# Patient Record
Sex: FEMALE | URBAN - METROPOLITAN AREA
[De-identification: names, ages, dates, MRNs, and addresses within clinical notes are randomized per-mention and may not be internally consistent; named-entity substitution may affect disease eponyms.]

---

## 2024-04-12 ENCOUNTER — OV NP (OUTPATIENT)
Dept: URBAN - METROPOLITAN AREA CLINIC 60 | Facility: CLINIC | Age: 54
End: 2024-04-12

## 2024-04-12 RX ORDER — CONJUGATED ESTROGENS 0.62 MG/G
APLICAR 0.5 GRAMOS USANDO EL APLICADOR 3 VECES POR SEMANA EN LAS NOCHES. (SUFICIENTE PARA 140 DIAS) CREAM VAGINAL
Qty: 30 GRAM | Refills: 0 | Status: ACTIVE | COMMUNITY

## 2024-04-12 RX ORDER — ATORVASTATIN CALCIUM 40 MG/1
TOMAR 1 TABLETA VIA ORAL TODAS LAS TARDES TABLET, FILM COATED ORAL
Qty: 30 EACH | Refills: 5 | Status: ACTIVE | COMMUNITY

## 2024-04-12 RX ORDER — BUDESONIDE 180 UG/1
HACER 2 ATOMIZACIONES VIA ORAL 2 VECES AL DIA AEROSOL, POWDER RESPIRATORY (INHALATION)
Qty: 1 EACH | Refills: 2 | Status: ACTIVE | COMMUNITY

## 2024-04-12 RX ORDER — EZETIMIBE 10 MG/1
TABLET ORAL
Qty: 90 TABLET | Status: ACTIVE | COMMUNITY

## 2024-04-12 RX ORDER — METRONIDAZOLE 7.5 MG/G
HACER 1 APLICACION EN LA VAGINA EN LAS NOCHES GEL VAGINAL
Qty: 70 GRAM | Refills: 0 | Status: ACTIVE | COMMUNITY

## 2024-04-12 RX ORDER — BUPROPION HYDROCHLORIDE 75 MG/1
TOMAR 1 TABLETA VIA ORAL CADA NOCHE A LA HORA DE DORMIR TABLET, FILM COATED ORAL
Qty: 30 EACH | Refills: 1 | Status: ACTIVE | COMMUNITY

## 2024-04-12 RX ORDER — ATORVASTATIN CALCIUM 40 MG/1
TOMAR 1 TABLETA VIA ORAL TODAS LAS TARDES TABLET, FILM COATED ORAL
Qty: 30 EACH | Refills: 2 | Status: ACTIVE | COMMUNITY

## 2024-04-12 RX ORDER — ALBUTEROL SULFATE 1.25 MG/3ML
INHALAR EL CONTENIDO DE 1 AMPULA VIA NEBULIZADOR 3 VECES AL DIA COMO NECESITE PARA LA FALTA DE AIRE SOLUTION RESPIRATORY (INHALATION)
Qty: 75 MILLILITER | Refills: 0 | Status: ACTIVE | COMMUNITY

## 2024-04-12 RX ORDER — KETOROLAC TROMETHAMINE 10 MG/1
TOMAR 1 TABLETA VIA ORAL 2 VECES AL DIA TABLET, FILM COATED ORAL
Qty: 10 EACH | Refills: 0 | Status: ACTIVE | COMMUNITY

## 2024-04-12 RX ORDER — EZETIMIBE 10 MG/1
TOMAR 1 TABLETA VIA ORAL 1 VEZ AL DIA TABLET ORAL
Qty: 90 EACH | Refills: 0 | Status: ACTIVE | COMMUNITY

## 2024-04-12 RX ORDER — SOLIFENACIN SUCCINATE 5 MG/1
TOMAR 1 TABLETA VIA ORAL CADA NOCHE A LA HORA DE DORMIR TABLET, FILM COATED ORAL
Qty: 30 EACH | Refills: 0 | Status: ACTIVE | COMMUNITY

## 2024-04-12 RX ORDER — AMITRIPTYLINE HYDROCHLORIDE 75 MG/1
TOMAR 1 TABLETA VIA ORAL TODAS LAS TARDES TABLET, FILM COATED ORAL
Qty: 30 EACH | Refills: 0 | Status: ACTIVE | COMMUNITY

## 2024-06-07 ENCOUNTER — OFFICE VISIT (OUTPATIENT)
Dept: URBAN - METROPOLITAN AREA CLINIC 60 | Facility: CLINIC | Age: 54
End: 2024-06-07
Payer: COMMERCIAL

## 2024-06-07 ENCOUNTER — DASHBOARD ENCOUNTERS (OUTPATIENT)
Age: 54
End: 2024-06-07

## 2024-06-07 VITALS
OXYGEN SATURATION: 98 % | DIASTOLIC BLOOD PRESSURE: 80 MMHG | HEART RATE: 88 BPM | RESPIRATION RATE: 20 BRPM | HEIGHT: 68 IN | WEIGHT: 195 LBS | BODY MASS INDEX: 29.55 KG/M2 | SYSTOLIC BLOOD PRESSURE: 140 MMHG | TEMPERATURE: 97.5 F

## 2024-06-07 DIAGNOSIS — K74.69 OTHER CIRRHOSIS OF LIVER: ICD-10-CM

## 2024-06-07 PROBLEM — 19943007: Status: ACTIVE | Noted: 2024-06-07

## 2024-06-07 PROCEDURE — 99204 OFFICE O/P NEW MOD 45 MIN: CPT | Performed by: NURSE PRACTITIONER

## 2024-06-07 RX ORDER — ATORVASTATIN CALCIUM 40 MG/1
TOMAR 1 TABLETA VIA ORAL TODAS LAS TARDES TABLET, FILM COATED ORAL
Qty: 30 EACH | Refills: 5 | Status: ACTIVE | COMMUNITY

## 2024-06-07 RX ORDER — ALBUTEROL SULFATE 1.25 MG/3ML
INHALAR EL CONTENIDO DE 1 AMPULA VIA NEBULIZADOR 3 VECES AL DIA COMO NECESITE PARA LA FALTA DE AIRE SOLUTION RESPIRATORY (INHALATION)
Qty: 75 MILLILITER | Refills: 0 | Status: ACTIVE | COMMUNITY

## 2024-06-07 RX ORDER — EZETIMIBE 10 MG/1
TOMAR 1 TABLETA VIA ORAL 1 VEZ AL DIA TABLET ORAL
Qty: 90 EACH | Refills: 0 | Status: ACTIVE | COMMUNITY

## 2024-06-07 RX ORDER — CONJUGATED ESTROGENS 0.62 MG/G
APLICAR 0.5 GRAMOS USANDO EL APLICADOR 3 VECES POR SEMANA EN LAS NOCHES. (SUFICIENTE PARA 140 DIAS) CREAM VAGINAL
Qty: 30 GRAM | Refills: 0 | Status: ACTIVE | COMMUNITY

## 2024-06-07 NOTE — HPI-TODAY'S VISIT:
6/24 Patient was referred here by PCP for elevated liver enzymes.  She has a medical history of liver cirrhosis, which she was diagnosed by Dr. Tapia over 5 years ago.  Unfortunately, we do not have record of that diagnosis.  She has a history of hepatitis A.  She has no GI complaints today. We will order a liver workup.

## 2024-06-14 ENCOUNTER — LAB OUTSIDE AN ENCOUNTER (OUTPATIENT)
Dept: URBAN - METROPOLITAN AREA CLINIC 60 | Facility: CLINIC | Age: 54
End: 2024-06-14

## 2024-06-19 ENCOUNTER — OFFICE VISIT (OUTPATIENT)
Dept: URBAN - METROPOLITAN AREA CLINIC 60 | Facility: CLINIC | Age: 54
End: 2024-06-19

## 2024-06-25 ENCOUNTER — LAB OUTSIDE AN ENCOUNTER (OUTPATIENT)
Dept: URBAN - METROPOLITAN AREA CLINIC 63 | Facility: CLINIC | Age: 54
End: 2024-06-25

## 2024-07-24 ENCOUNTER — OFFICE VISIT (OUTPATIENT)
Dept: URBAN - METROPOLITAN AREA CLINIC 60 | Facility: CLINIC | Age: 54
End: 2024-07-24
Payer: COMMERCIAL

## 2024-07-24 VITALS
BODY MASS INDEX: 29.86 KG/M2 | HEART RATE: 84 BPM | RESPIRATION RATE: 20 BRPM | OXYGEN SATURATION: 96 % | SYSTOLIC BLOOD PRESSURE: 130 MMHG | TEMPERATURE: 97.8 F | DIASTOLIC BLOOD PRESSURE: 78 MMHG | HEIGHT: 68 IN | WEIGHT: 197 LBS

## 2024-07-24 DIAGNOSIS — K76.0 STEATOSIS, LIVER: ICD-10-CM

## 2024-07-24 PROBLEM — 197321007: Status: ACTIVE | Noted: 2024-07-24

## 2024-07-24 PROCEDURE — 99213 OFFICE O/P EST LOW 20 MIN: CPT | Performed by: NURSE PRACTITIONER

## 2024-07-24 RX ORDER — CONJUGATED ESTROGENS 0.62 MG/G
APLICAR 0.5 GRAMOS USANDO EL APLICADOR 3 VECES POR SEMANA EN LAS NOCHES. (SUFICIENTE PARA 140 DIAS) CREAM VAGINAL
Qty: 30 GRAM | Refills: 0 | Status: ACTIVE | COMMUNITY

## 2024-07-24 RX ORDER — EZETIMIBE 10 MG/1
TOMAR 1 TABLETA VIA ORAL 1 VEZ AL DIA TABLET ORAL
Qty: 90 EACH | Refills: 0 | Status: ACTIVE | COMMUNITY

## 2024-07-24 RX ORDER — ATORVASTATIN CALCIUM 40 MG/1
TOMAR 1 TABLETA VIA ORAL TODAS LAS TARDES TABLET, FILM COATED ORAL
Qty: 30 EACH | Refills: 5 | Status: ACTIVE | COMMUNITY

## 2024-07-24 RX ORDER — ALBUTEROL SULFATE 1.25 MG/3ML
INHALAR EL CONTENIDO DE 1 AMPULA VIA NEBULIZADOR 3 VECES AL DIA COMO NECESITE PARA LA FALTA DE AIRE SOLUTION RESPIRATORY (INHALATION)
Qty: 75 MILLILITER | Refills: 0 | Status: ACTIVE | COMMUNITY

## 2024-07-24 NOTE — HPI-TODAY'S VISIT:
6/24 Patient was referred here by PCP for elevated liver enzymes.  She has a medical history of liver cirrhosis, which she was diagnosed by Dr. Tapia over 5 years ago.  Unfortunately, we do not have record of that diagnosis.  She has a history of hepatitis A.  She has no GI complaints today. We will order a liver workup 7/24 Patient ishere today in good general state.  Today she has no GI complaints, her FibroScan shows evidence of severe  liver asteatosis without fibrosis or cirrhosis. Abdominal ultrasound negative for ascites, negative for liver lesion.  Laboratories shows evidence of normal liver and kidney function.  Slightly elevated cholesterol and triglyceride, Hb, A1c 5.8, negative  hepatitis viral panel, negative for autoimmune hepatitis, normal PT/INR, normal alpha-fetoprotein.

## 2025-01-24 ENCOUNTER — OFFICE VISIT (OUTPATIENT)
Dept: URBAN - METROPOLITAN AREA CLINIC 60 | Facility: CLINIC | Age: 55
End: 2025-01-24

## 2025-01-24 RX ORDER — ALBUTEROL SULFATE 1.25 MG/3ML
INHALAR EL CONTENIDO DE 1 AMPULA VIA NEBULIZADOR 3 VECES AL DIA COMO NECESITE PARA LA FALTA DE AIRE SOLUTION RESPIRATORY (INHALATION)
Qty: 75 MILLILITER | Refills: 0 | Status: ACTIVE | COMMUNITY

## 2025-01-24 RX ORDER — ATORVASTATIN CALCIUM 40 MG/1
TOMAR 1 TABLETA VIA ORAL TODAS LAS TARDES TABLET, FILM COATED ORAL
Qty: 30 EACH | Refills: 5 | Status: ACTIVE | COMMUNITY

## 2025-01-24 RX ORDER — EZETIMIBE 10 MG/1
TOMAR 1 TABLETA VIA ORAL 1 VEZ AL DIA TABLET ORAL
Qty: 90 EACH | Refills: 0 | Status: ACTIVE | COMMUNITY

## 2025-01-24 RX ORDER — CONJUGATED ESTROGENS 0.62 MG/G
APLICAR 0.5 GRAMOS USANDO EL APLICADOR 3 VECES POR SEMANA EN LAS NOCHES. (SUFICIENTE PARA 140 DIAS) CREAM VAGINAL
Qty: 30 GRAM | Refills: 0 | Status: ACTIVE | COMMUNITY